# Patient Record
Sex: FEMALE | Race: WHITE | NOT HISPANIC OR LATINO | Employment: FULL TIME | URBAN - METROPOLITAN AREA
[De-identification: names, ages, dates, MRNs, and addresses within clinical notes are randomized per-mention and may not be internally consistent; named-entity substitution may affect disease eponyms.]

---

## 2021-07-01 ENCOUNTER — PATIENT MESSAGE (OUTPATIENT)
Dept: ADMINISTRATIVE | Facility: OTHER | Age: 51
End: 2021-07-01

## 2023-04-21 ENCOUNTER — HOSPITAL ENCOUNTER (OUTPATIENT)
Facility: HOSPITAL | Age: 53
Discharge: HOME | End: 2023-04-22
Attending: STUDENT IN AN ORGANIZED HEALTH CARE EDUCATION/TRAINING PROGRAM | Admitting: FAMILY MEDICINE
Payer: COMMERCIAL

## 2023-04-21 DIAGNOSIS — R55 SYNCOPE: ICD-10-CM

## 2023-04-21 PROBLEM — K52.9 IBD (INFLAMMATORY BOWEL DISEASE): Status: ACTIVE | Noted: 2023-04-21

## 2023-04-21 PROBLEM — M35.01 SJOGREN SYNDROME WITH KERATOCONJUNCTIVITIS: Status: ACTIVE | Noted: 2023-04-21

## 2023-04-21 PROBLEM — I87.1 MAY-THURNER SYNDROME: Status: ACTIVE | Noted: 2023-04-21

## 2023-04-21 LAB
ALBUMIN SERPL BCP-MCNC: 3.4 G/DL (ref 3.5–5.2)
ALP SERPL-CCNC: 125 U/L (ref 55–135)
ALT SERPL W/O P-5'-P-CCNC: 16 U/L (ref 10–44)
ANION GAP SERPL CALC-SCNC: 8 MMOL/L (ref 8–16)
AST SERPL-CCNC: 18 U/L (ref 10–40)
B-HCG UR QL: NEGATIVE
BASOPHILS # BLD AUTO: 0.02 K/UL (ref 0–0.2)
BASOPHILS NFR BLD: 0.3 % (ref 0–1.9)
BILIRUB SERPL-MCNC: 0.5 MG/DL (ref 0.1–1)
BILIRUB UR QL STRIP: NEGATIVE
BUN SERPL-MCNC: 8 MG/DL (ref 6–20)
CALCIUM SERPL-MCNC: 9 MG/DL (ref 8.7–10.5)
CHLORIDE SERPL-SCNC: 109 MMOL/L (ref 95–110)
CLARITY UR: CLEAR
CO2 SERPL-SCNC: 23 MMOL/L (ref 23–29)
COLOR UR: YELLOW
CREAT SERPL-MCNC: 0.8 MG/DL (ref 0.5–1.4)
D DIMER PPP IA.FEU-MCNC: 0.39 MG/L FEU
DIFFERENTIAL METHOD: ABNORMAL
EOSINOPHIL # BLD AUTO: 0 K/UL (ref 0–0.5)
EOSINOPHIL NFR BLD: 0.3 % (ref 0–8)
ERYTHROCYTE [DISTWIDTH] IN BLOOD BY AUTOMATED COUNT: 13.5 % (ref 11.5–14.5)
EST. GFR  (NO RACE VARIABLE): >60 ML/MIN/1.73 M^2
GLUCOSE SERPL-MCNC: 107 MG/DL (ref 70–110)
GLUCOSE UR QL STRIP: NEGATIVE
HCT VFR BLD AUTO: 37.1 % (ref 37–48.5)
HGB BLD-MCNC: 11.5 G/DL (ref 12–16)
HGB UR QL STRIP: ABNORMAL
IMM GRANULOCYTES # BLD AUTO: 0.07 K/UL (ref 0–0.04)
IMM GRANULOCYTES NFR BLD AUTO: 1 % (ref 0–0.5)
KETONES UR QL STRIP: NEGATIVE
LEUKOCYTE ESTERASE UR QL STRIP: NEGATIVE
LYMPHOCYTES # BLD AUTO: 1.5 K/UL (ref 1–4.8)
LYMPHOCYTES NFR BLD: 22.1 % (ref 18–48)
MCH RBC QN AUTO: 28 PG (ref 27–31)
MCHC RBC AUTO-ENTMCNC: 31 G/DL (ref 32–36)
MCV RBC AUTO: 90 FL (ref 82–98)
MONOCYTES # BLD AUTO: 0.4 K/UL (ref 0.3–1)
MONOCYTES NFR BLD: 6 % (ref 4–15)
NEUTROPHILS # BLD AUTO: 4.8 K/UL (ref 1.8–7.7)
NEUTROPHILS NFR BLD: 70.3 % (ref 38–73)
NITRITE UR QL STRIP: NEGATIVE
NRBC BLD-RTO: 0 /100 WBC
PH UR STRIP: 7 [PH] (ref 5–8)
PLATELET # BLD AUTO: 276 K/UL (ref 150–450)
PMV BLD AUTO: 9.9 FL (ref 9.2–12.9)
POCT GLUCOSE: 78 MG/DL (ref 70–110)
POTASSIUM SERPL-SCNC: 3.9 MMOL/L (ref 3.5–5.1)
PROT SERPL-MCNC: 7.1 G/DL (ref 6–8.4)
PROT UR QL STRIP: NEGATIVE
RBC # BLD AUTO: 4.11 M/UL (ref 4–5.4)
SODIUM SERPL-SCNC: 140 MMOL/L (ref 136–145)
SP GR UR STRIP: 1.02 (ref 1–1.03)
TROPONIN I SERPL DL<=0.01 NG/ML-MCNC: <0.006 NG/ML (ref 0–0.03)
TROPONIN I SERPL DL<=0.01 NG/ML-MCNC: <0.006 NG/ML (ref 0–0.03)
TSH SERPL DL<=0.005 MIU/L-ACNC: 3.79 UIU/ML (ref 0.4–4)
URN SPEC COLLECT METH UR: ABNORMAL
UROBILINOGEN UR STRIP-ACNC: NEGATIVE EU/DL
WBC # BLD AUTO: 6.78 K/UL (ref 3.9–12.7)

## 2023-04-21 PROCEDURE — 99222 PR INITIAL HOSPITAL CARE,LEVL II: ICD-10-PCS | Mod: ,,, | Performed by: FAMILY MEDICINE

## 2023-04-21 PROCEDURE — 93010 EKG 12-LEAD: ICD-10-PCS | Mod: 76,,, | Performed by: INTERNAL MEDICINE

## 2023-04-21 PROCEDURE — 94760 N-INVAS EAR/PLS OXIMETRY 1: CPT

## 2023-04-21 PROCEDURE — G0378 HOSPITAL OBSERVATION PER HR: HCPCS

## 2023-04-21 PROCEDURE — 93005 ELECTROCARDIOGRAM TRACING: CPT

## 2023-04-21 PROCEDURE — 99285 EMERGENCY DEPT VISIT HI MDM: CPT | Mod: 25

## 2023-04-21 PROCEDURE — 81025 URINE PREGNANCY TEST: CPT | Performed by: STUDENT IN AN ORGANIZED HEALTH CARE EDUCATION/TRAINING PROGRAM

## 2023-04-21 PROCEDURE — 99222 1ST HOSP IP/OBS MODERATE 55: CPT | Mod: ,,, | Performed by: FAMILY MEDICINE

## 2023-04-21 PROCEDURE — 96360 HYDRATION IV INFUSION INIT: CPT

## 2023-04-21 PROCEDURE — 25000003 PHARM REV CODE 250: Performed by: STUDENT IN AN ORGANIZED HEALTH CARE EDUCATION/TRAINING PROGRAM

## 2023-04-21 PROCEDURE — 81003 URINALYSIS AUTO W/O SCOPE: CPT | Performed by: STUDENT IN AN ORGANIZED HEALTH CARE EDUCATION/TRAINING PROGRAM

## 2023-04-21 PROCEDURE — 36415 COLL VENOUS BLD VENIPUNCTURE: CPT | Performed by: STUDENT IN AN ORGANIZED HEALTH CARE EDUCATION/TRAINING PROGRAM

## 2023-04-21 PROCEDURE — 84443 ASSAY THYROID STIM HORMONE: CPT | Performed by: STUDENT IN AN ORGANIZED HEALTH CARE EDUCATION/TRAINING PROGRAM

## 2023-04-21 PROCEDURE — 85379 FIBRIN DEGRADATION QUANT: CPT | Performed by: STUDENT IN AN ORGANIZED HEALTH CARE EDUCATION/TRAINING PROGRAM

## 2023-04-21 PROCEDURE — 84484 ASSAY OF TROPONIN QUANT: CPT | Performed by: STUDENT IN AN ORGANIZED HEALTH CARE EDUCATION/TRAINING PROGRAM

## 2023-04-21 PROCEDURE — 80053 COMPREHEN METABOLIC PANEL: CPT | Performed by: STUDENT IN AN ORGANIZED HEALTH CARE EDUCATION/TRAINING PROGRAM

## 2023-04-21 PROCEDURE — 96361 HYDRATE IV INFUSION ADD-ON: CPT

## 2023-04-21 PROCEDURE — 85025 COMPLETE CBC W/AUTO DIFF WBC: CPT | Performed by: STUDENT IN AN ORGANIZED HEALTH CARE EDUCATION/TRAINING PROGRAM

## 2023-04-21 PROCEDURE — 25000003 PHARM REV CODE 250: Performed by: FAMILY MEDICINE

## 2023-04-21 PROCEDURE — 93010 ELECTROCARDIOGRAM REPORT: CPT | Mod: 76,,, | Performed by: INTERNAL MEDICINE

## 2023-04-21 RX ORDER — SODIUM CHLORIDE 0.9 % (FLUSH) 0.9 %
10 SYRINGE (ML) INJECTION
Status: DISCONTINUED | OUTPATIENT
Start: 2023-04-21 | End: 2023-04-22 | Stop reason: HOSPADM

## 2023-04-21 RX ORDER — TALC
6 POWDER (GRAM) TOPICAL NIGHTLY PRN
Status: DISCONTINUED | OUTPATIENT
Start: 2023-04-21 | End: 2023-04-22 | Stop reason: HOSPADM

## 2023-04-21 RX ORDER — PROMETHAZINE HYDROCHLORIDE 12.5 MG/1
12.5 TABLET ORAL EVERY 6 HOURS PRN
Status: DISCONTINUED | OUTPATIENT
Start: 2023-04-21 | End: 2023-04-22 | Stop reason: HOSPADM

## 2023-04-21 RX ORDER — ONDANSETRON 4 MG/1
4 TABLET, ORALLY DISINTEGRATING ORAL EVERY 6 HOURS PRN
Status: DISCONTINUED | OUTPATIENT
Start: 2023-04-21 | End: 2023-04-21 | Stop reason: SDUPTHER

## 2023-04-21 RX ORDER — MECLIZINE HYDROCHLORIDE 25 MG/1
25 TABLET ORAL 3 TIMES DAILY PRN
Status: DISCONTINUED | OUTPATIENT
Start: 2023-04-21 | End: 2023-04-22 | Stop reason: HOSPADM

## 2023-04-21 RX ORDER — MECLIZINE HYDROCHLORIDE 25 MG/1
50 TABLET ORAL
Status: COMPLETED | OUTPATIENT
Start: 2023-04-21 | End: 2023-04-21

## 2023-04-21 RX ORDER — ONDANSETRON 4 MG/1
4 TABLET, ORALLY DISINTEGRATING ORAL EVERY 6 HOURS PRN
Status: DISCONTINUED | OUTPATIENT
Start: 2023-04-21 | End: 2023-04-22 | Stop reason: HOSPADM

## 2023-04-21 RX ORDER — ONDANSETRON 4 MG/1
4 TABLET, ORALLY DISINTEGRATING ORAL EVERY 6 HOURS PRN
Status: DISCONTINUED | OUTPATIENT
Start: 2023-04-21 | End: 2023-04-21

## 2023-04-21 RX ADMIN — ONDANSETRON 4 MG: 4 TABLET, ORALLY DISINTEGRATING ORAL at 11:04

## 2023-04-21 RX ADMIN — MECLIZINE HYDROCHLORIDE 50 MG: 25 TABLET ORAL at 09:04

## 2023-04-21 RX ADMIN — SODIUM CHLORIDE 1000 ML: 0.9 INJECTION, SOLUTION INTRAVENOUS at 08:04

## 2023-04-21 RX ADMIN — MECLIZINE HYDROCHLORIDE 25 MG: 25 TABLET ORAL at 11:04

## 2023-04-21 RX ADMIN — SODIUM CHLORIDE 1000 ML: 0.9 INJECTION, SOLUTION INTRAVENOUS at 09:04

## 2023-04-21 NOTE — CONSULTS
[facility] - Emergency Dept  Cardiology  Consult Note    Patient Name: Dequan Cardenas  MRN: [id number]  Admission Date: 2023  Hospital Length of Stay: 0 days  Code Status: No Order   Attending Provider: Alex Turner MD   Consulting Provider: Ya Amezcua NP  Primary Care Physician: Akhil Dill MD  Principal Problem:<principal problem not specified>        Inpatient consult to [facility]  Consult performed by: Ya Amezcua NP  Consult ordered by: Alex Turner MD      Subjective:     Chief Complaint:  dizziness and syncope     HPI: 52-year-old female with history of IBS, Sjogren's, thyroid disorder, previous anemia, presenting with lightheadedness and one episode of syncope.  Patient reports that this morning at around 3:00 a.m., she went to urinate, and felt slightly lightheaded.  Vomited once this morning, and on her way to work became very dizzy.  She pulled over, called for help.  When EMS arrived they noted her to have a syncopal episode.  Patient came around immediately.  Denies any chest pain or shortness of breath.  Denies any large volume losses with diarrhea.  Denies any bleeding.  No cough, congestion    Only seen sporadically  by [facility] most recently in     Past Medical History:   Diagnosis Date    IBS (irritable bowel syndrome)     Sjogren's syndrome        Past Surgical History:   Procedure Laterality Date     SECTION      x2    CHOLECYSTECTOMY      HYSTERECTOMY         Review of patient's allergies indicates:   Allergen Reactions    Iodinated contrast media        No current facility-administered medications on file prior to encounter.     Current Outpatient Medications on File Prior to Encounter   Medication Sig    colestipoL (COLESTID) 1 gram Tab colestipol 1 gram tablet   TAKE ONE TABLET BY MOUTH PRIOR TO BREAKFAST AND SUPPER    diphenoxylate-atropine 2.5-0.025 mg (LOMOTIL) 2.5-0.025 mg per tablet Take 1 tablet by mouth 2 (two) times daily as needed.     ergocalciferol, vitamin D2, (VITAMIN D ORAL) Take by mouth.     Family History       Problem Relation (Age of Onset)    COPD Mother    Cancer Mother (70)    Heart attack Father    Heart disease Mother, Father    Mitral valve prolapse Mother    Stroke Mother          Tobacco Use    Smoking status: Never    Smokeless tobacco: Never   Substance and Sexual Activity    Alcohol use: Not Currently    Drug use: Never    Sexual activity: Not on file     ROS  Constitutional:  Negative for fever.   HENT:  Negative for congestion and sore throat.    Respiratory:  Negative for shortness of breath.    Cardiovascular:  Negative for chest pain.   Gastrointestinal:  Positive for nausea and vomiting. Negative for abdominal pain and diarrhea.   Genitourinary:  Negative for dysuria.   Musculoskeletal:  Negative for back pain.   Skin:  Negative for rash.   Neurological:  Positive for syncope and light-headedness. Negative for weakness.   Hematological:  Does not bruise/bleed easily.   Objective:     Vital Signs (Most Recent):  Temp: 97.8 °F (36.6 °C) (04/21/23 0758)  Pulse: 60 (04/21/23 1002)  Resp: 15 (04/21/23 1002)  BP: 123/65 (04/21/23 1002)  SpO2: 100 % (04/21/23 1002) Vital Signs (24h Range):  Temp:  [97.8 °F (36.6 °C)] 97.8 °F (36.6 °C)  Pulse:  [58-80] 60  Resp:  [10-20] 15  SpO2:  [98 %-100 %] 100 %  BP: (112-141)/(52-79) 123/65        There is no height or weight on file to calculate BMI.    SpO2: 100 %         Intake/Output Summary (Last 24 hours) at 4/21/2023 1017  Last data filed at 4/21/2023 0853  Gross per 24 hour   Intake 1000 ml   Output --   Net 1000 ml       Lines/Drains/Airways       Peripheral Intravenous Line  Duration                  Peripheral IV - Single Lumen 04/21/23 0756 20 G Left Hand <1 day                    Physical Exam  Nursing note and vitals reviewed.  Constitutional: She appears well-developed.   HENT:   Head: Normocephalic.   Eyes: Pupils are equal, round, and reactive to light.   Neck:   Normal  range of motion.  Cardiovascular:            No murmur heard.  Pulmonary/Chest: No respiratory distress.   Clear lungs bilaterally.  No respiratory distress.  No wheezing or rales.  Good air movement.     Abdominal: Abdomen is soft. She exhibits no distension. There is no abdominal tenderness. There is no rebound.   Musculoskeletal:         General: No edema.      Cervical back: Normal range of motion.      Neurological: She is alert and oriented to person, place, and time.   Alert and oriented to person place and time. No facial droop. CN 2-12 intact. Fluent speech with expression and comprehension. Strength 5/5 and sensation intact in upper and lower extremities.   Skin: Skin is warm.   Psychiatric: She has a normal mood and affect.   Significant Labs:   Recent Lab Results         04/21/23  0804        Albumin 3.4       Alkaline Phosphatase 125       ALT 16       Anion Gap 8       AST 18       Baso # 0.02       Basophil % 0.3       BILIRUBIN TOTAL 0.5  Comment: For infants and newborns, interpretation of results should be based  on gestational age, weight and in agreement with clinical  observations.    Premature Infant recommended reference ranges:  Up to 24 hours.............<8.0 mg/dL  Up to 48 hours............<12.0 mg/dL  3-5 days..................<15.0 mg/dL  6-29 days.................<15.0 mg/dL         BUN 8       Calcium 9.0       Chloride 109       CO2 23       Creatinine 0.8       Differential Method Automated       eGFR >60       Eos # 0.0       Eosinophil % 0.3       Glucose 107       Gran # (ANC) 4.8       Gran % 70.3       Hematocrit 37.1       Hemoglobin 11.5       Immature Grans (Abs) 0.07  Comment: Mild elevation in immature granulocytes is non specific and   can be seen in a variety of conditions including stress response,   acute inflammation, trauma and pregnancy. Correlation with other   laboratory and clinical findings is essential.         Immature Granulocytes 1.0       Lymph # 1.5        Lymph % 22.1       MCH 28.0       MCHC 31.0       MCV 90       Mono # 0.4       Mono % 6.0       MPV 9.9       nRBC 0       Platelets 276       Potassium 3.9       PROTEIN TOTAL 7.1       RBC 4.11       RDW 13.5       Sodium 140       Troponin I <0.006  Comment: The reference interval for Troponin I represents the 99th percentile   cutoff   for our facility and is consistent with 3rd generation assay   performance.         TSH 3.786       WBC 6.78               Significant Imaging:   CT head pending  Echo 2013:     The study quality is average.   The left ventricle is normal in size. Global left ventricular systolic function is normal. The left ventricular ejection fraction is 60%. Left ventricular diastolic function is normal.   Diffuse thickening of the aortic valve cusps.  The pulmonary artery systolic pressure is 29 mmHg.                 Assessment and Plan:     There are no hospital problems to display for this patient.      VTE Risk Mitigation (From admission, onward)      None          Dx:  Syncope/dizziness despite normal VS, mild transient nystagmus on exam suggestive of neuro etiology. Head CT ok  Bradycardia, LVEF 60% 2013.  Dizziness  May Thurner     Plan  May need neuro input and obs  Daphne, maria del carmen Amezcua NP  Cardiology   [facility] - Emergency Dept  I attest that I have personally seen and examined this patient. I have reviewed and discussed the management in detail as outlined above.

## 2023-04-21 NOTE — H&P
[facility] Surg ([address] [place] [address] Medicine  History & Physical    Patient Name: Dequan Cardenas  MRN: [id number]  Patient Class: OP- Observation  Admission Date: 2023  Attending Physician: Gale Blum MD   Primary Care Provider: Akhil Dill MD         Patient information was obtained from patient, spouse/SO and ER records.     Subjective:     Principal Problem:<principal problem not specified>    Chief Complaint:   Chief Complaint   Patient presents with    Dizziness     Brought via [facility] with complaints of dizziness and syncope episode this morning  EMS reports pt became very dizzy while driving to work. States when the patient stood up she passed out.         HPI: 52 year old female with sjogrens and may thurner syndrome as well as ibd was getting to school this morning and stopped at [employer] for a coke. However, on her way to school and this morning she felt dizzy. Once she got to school she says she felt really bad and told her friend that she might not be able to make it and to come check on her in case she passed out. She got very dizzy when getting out of her car so she was sat back down and EMS was called. When she tried to stand again she had a complete syncopal spell. She says she was told that she looked pale and bad. The first time she felt like she was going to pass out and 'felt it coming on.' The second time it was witness per EMS. Work up at that point was normal. In the ER she was seen by cardiology who felt like the etiology was non cardiac. She was also evaluated and neurology has recommended that she be observed for resolution of dizziness and no further spells. After eating, her symtpoms have pretty much resolved.      Past Medical History:   Diagnosis Date    IBS (irritable bowel syndrome)     Sjogren's syndrome        Past Surgical History:   Procedure Laterality Date     SECTION      x2    CHOLECYSTECTOMY      HYSTERECTOMY         Review of patient's  allergies indicates:   Allergen Reactions    Iodinated contrast media        No current facility-administered medications on file prior to encounter.     Current Outpatient Medications on File Prior to Encounter   Medication Sig    colestipoL (COLESTID) 1 gram Tab 1 g once daily.    diphenoxylate-atropine 2.5-0.025 mg (LOMOTIL) 2.5-0.025 mg per tablet Take 1 tablet by mouth 2 (two) times daily as needed.    ergocalciferol, vitamin D2, (VITAMIN D ORAL) Take by mouth.     Family History       Problem Relation (Age of Onset)    COPD Mother    Cancer Mother (70)    Heart attack Father    Heart disease Mother, Father    Mitral valve prolapse Mother    Stroke Mother          Tobacco Use    Smoking status: Never    Smokeless tobacco: Never   Substance and Sexual Activity    Alcohol use: Not Currently    Drug use: Never    Sexual activity: Not on file     Review of Systems   Constitutional:  Negative for chills and fever.   HENT:  Negative for congestion, ear pain, postnasal drip, rhinorrhea, sore throat and trouble swallowing.    Eyes:  Negative for redness and itching.   Respiratory:  Negative for cough, shortness of breath and wheezing.    Cardiovascular:  Negative for chest pain and palpitations.   Gastrointestinal:  Negative for abdominal pain, diarrhea, nausea and vomiting.   Genitourinary:  Negative for dysuria and frequency.   Skin:  Negative for rash.   Neurological:  Positive for dizziness, syncope and light-headedness. Negative for weakness and headaches.   Objective:     Vital Signs (Most Recent):  Temp: 97.8 °F (36.6 °C) (04/21/23 1629)  Pulse: (!) 57 (04/21/23 1629)  Resp: 14 (04/21/23 1629)  BP: 139/62 (04/21/23 1629)  SpO2: 100 % (04/21/23 1629)   Vital Signs (24h Range):  Temp:  [97.7 °F (36.5 °C)-98.2 °F (36.8 °C)] 97.8 °F (36.6 °C)  Pulse:  [57-80] 57  Resp:  [10-20] 14  SpO2:  [98 %-100 %] 100 %  BP: (112-146)/(52-79) 139/62     Weight: 104.8 kg (231 lb)  Body mass index is 42.25  kg/m².    Physical Exam  Vitals and nursing note reviewed.   Constitutional:       General: She is not in acute distress.     Appearance: She is well-developed.   HENT:      Head: Normocephalic and atraumatic.   Eyes:      Conjunctiva/sclera: Conjunctivae normal.      Pupils: Pupils are equal, round, and reactive to light.   Neck:      Thyroid: No thyromegaly.   Cardiovascular:      Rate and Rhythm: Normal rate and regular rhythm.      Heart sounds: Normal heart sounds.   Pulmonary:      Effort: Pulmonary effort is normal. No respiratory distress.      Breath sounds: Normal breath sounds. No wheezing.   Abdominal:      General: Bowel sounds are normal.      Palpations: Abdomen is soft.      Tenderness: There is no abdominal tenderness.   Musculoskeletal:         General: Normal range of motion.      Cervical back: Normal range of motion and neck supple.   Lymphadenopathy:      Cervical: No cervical adenopathy.   Skin:     General: Skin is warm and dry.      Findings: No rash.   Neurological:      Mental Status: She is alert and oriented to person, place, and time.   Psychiatric:         Behavior: Behavior normal.         CRANIAL NERVES     CN III, IV, VI   Pupils are equal, round, and reactive to light.     Significant Labs: All pertinent labs within the past 24 hours have been reviewed.  CBC:   Recent Labs   Lab 04/21/23  0804   WBC 6.78   HGB 11.5*   HCT 37.1        CMP:   Recent Labs   Lab 04/21/23  0804      K 3.9      CO2 23      BUN 8   CREATININE 0.8   CALCIUM 9.0   PROT 7.1   ALBUMIN 3.4*   BILITOT 0.5   ALKPHOS 125   AST 18   ALT 16   ANIONGAP 8     Cardiac Markers: No results for input(s): CKMB, MYOGLOBIN, BNP, TROPISTAT in the last 48 hours.  Lactic Acid: No results for input(s): LACTATE in the last 48 hours.  Magnesium: No results for input(s): MG in the last 48 hours.  Troponin:   Recent Labs   Lab 04/21/23  0804 04/21/23  1032   TROPONINI <0.006 <0.006     TSH:   Recent Labs  "  Lab 04/21/23  0804   TSH 3.786     Urine Culture: No results for input(s): LABURIN in the last 48 hours.    Significant Imaging: I have reviewed all pertinent imaging results/findings within the past 24 hours.        Assessment/Plan:     Sjogren syndrome with keratoconjunctivitis  Not taking meds - "only affects her eyes."      IBD (inflammatory bowel disease)  At baseline. Has had no recent diarrhea      May-Thurner syndrome  Not on anticoagulation.  "found by accident by [facility]"      Syncope  ER work up reassuring.  Keep on tele overnight.  MRI reassuring.  May d/c in am for further outpatinet workup if dizziness has resolved and patient without further symptoms.        VTE Risk Mitigation (From admission, onward)         Ordered     IP VTE HIGH RISK PATIENT  Once         04/21/23 1627     Place sequential compression device  Until discontinued         04/21/23 1627                   On 04/21/2023, patient should be placed in hospital observation services under my care.        Gale Blum MD  Department of Hospital Medicine  [place] - Med Surg (3rd Fl)  "

## 2023-04-21 NOTE — ED TRIAGE NOTES
52 y.o. female presents to ER ED 02/ED 02A   Chief Complaint   Patient presents with    Dizziness     Brought via [facility] with complaints of dizziness and syncope episode this morning  EMS reports pt became very dizzy while driving to work. States when the patient stood up she passed out.    . No acute distress noted.

## 2023-04-21 NOTE — ASSESSMENT & PLAN NOTE
ER work up reassuring.  Keep on tele overnight.  MRI reassuring.  May d/c in am for further outpatinet workup if dizziness has resolved and patient without further symptoms.

## 2023-04-21 NOTE — SUBJECTIVE & OBJECTIVE
Past Medical History:   Diagnosis Date    IBS (irritable bowel syndrome)     Sjogren's syndrome        Past Surgical History:   Procedure Laterality Date     SECTION      x2    CHOLECYSTECTOMY      HYSTERECTOMY         Review of patient's allergies indicates:   Allergen Reactions    Iodinated contrast media        No current facility-administered medications on file prior to encounter.     Current Outpatient Medications on File Prior to Encounter   Medication Sig    colestipoL (COLESTID) 1 gram Tab 1 g once daily.    diphenoxylate-atropine 2.5-0.025 mg (LOMOTIL) 2.5-0.025 mg per tablet Take 1 tablet by mouth 2 (two) times daily as needed.    ergocalciferol, vitamin D2, (VITAMIN D ORAL) Take by mouth.     Family History       Problem Relation (Age of Onset)    COPD Mother    Cancer Mother (70)    Heart attack Father    Heart disease Mother, Father    Mitral valve prolapse Mother    Stroke Mother          Tobacco Use    Smoking status: Never    Smokeless tobacco: Never   Substance and Sexual Activity    Alcohol use: Not Currently    Drug use: Never    Sexual activity: Not on file     Review of Systems   Constitutional:  Negative for chills and fever.   HENT:  Negative for congestion, ear pain, postnasal drip, rhinorrhea, sore throat and trouble swallowing.    Eyes:  Negative for redness and itching.   Respiratory:  Negative for cough, shortness of breath and wheezing.    Cardiovascular:  Negative for chest pain and palpitations.   Gastrointestinal:  Negative for abdominal pain, diarrhea, nausea and vomiting.   Genitourinary:  Negative for dysuria and frequency.   Skin:  Negative for rash.   Neurological:  Positive for dizziness, syncope and light-headedness. Negative for weakness and headaches.   Objective:     Vital Signs (Most Recent):  Temp: 97.8 °F (36.6 °C) (23)  Pulse: (!) 57 (23)  Resp: 14 (23)  BP: 139/62 (23)  SpO2: 100 % (23)   Vital Signs (24h  Range):  Temp:  [97.7 °F (36.5 °C)-98.2 °F (36.8 °C)] 97.8 °F (36.6 °C)  Pulse:  [57-80] 57  Resp:  [10-20] 14  SpO2:  [98 %-100 %] 100 %  BP: (112-146)/(52-79) 139/62     Weight: 104.8 kg (231 lb)  Body mass index is 42.25 kg/m².    Physical Exam  Vitals and nursing note reviewed.   Constitutional:       General: She is not in acute distress.     Appearance: She is well-developed.   HENT:      Head: Normocephalic and atraumatic.   Eyes:      Conjunctiva/sclera: Conjunctivae normal.      Pupils: Pupils are equal, round, and reactive to light.   Neck:      Thyroid: No thyromegaly.   Cardiovascular:      Rate and Rhythm: Normal rate and regular rhythm.      Heart sounds: Normal heart sounds.   Pulmonary:      Effort: Pulmonary effort is normal. No respiratory distress.      Breath sounds: Normal breath sounds. No wheezing.   Abdominal:      General: Bowel sounds are normal.      Palpations: Abdomen is soft.      Tenderness: There is no abdominal tenderness.   Musculoskeletal:         General: Normal range of motion.      Cervical back: Normal range of motion and neck supple.   Lymphadenopathy:      Cervical: No cervical adenopathy.   Skin:     General: Skin is warm and dry.      Findings: No rash.   Neurological:      Mental Status: She is alert and oriented to person, place, and time.   Psychiatric:         Behavior: Behavior normal.         CRANIAL NERVES     CN III, IV, VI   Pupils are equal, round, and reactive to light.     Significant Labs: All pertinent labs within the past 24 hours have been reviewed.  CBC:   Recent Labs   Lab 04/21/23  0804   WBC 6.78   HGB 11.5*   HCT 37.1        CMP:   Recent Labs   Lab 04/21/23  0804      K 3.9      CO2 23      BUN 8   CREATININE 0.8   CALCIUM 9.0   PROT 7.1   ALBUMIN 3.4*   BILITOT 0.5   ALKPHOS 125   AST 18   ALT 16   ANIONGAP 8     Cardiac Markers: No results for input(s): CKMB, MYOGLOBIN, BNP, TROPISTAT in the last 48 hours.  Lactic Acid: No  results for input(s): LACTATE in the last 48 hours.  Magnesium: No results for input(s): MG in the last 48 hours.  Troponin:   Recent Labs   Lab 04/21/23  0804 04/21/23  1032   TROPONINI <0.006 <0.006     TSH:   Recent Labs   Lab 04/21/23  0804   TSH 3.786     Urine Culture: No results for input(s): LABURIN in the last 48 hours.    Significant Imaging: I have reviewed all pertinent imaging results/findings within the past 24 hours.

## 2023-04-21 NOTE — ED PROVIDER NOTES
Encounter Date: 2023       History     Chief Complaint   Patient presents with    Dizziness     Brought via [facility] with complaints of dizziness and syncope episode this morning  EMS reports pt became very dizzy while driving to work. States when the patient stood up she passed out.      52-year-old female with history of IBS, Sjogren's, thyroid disorder, previous anemia, presenting with lightheadedness and one episode of syncope.  Patient reports that this morning at around 3:00 a.m., she went to urinate, and felt slightly lightheaded.  Vomited once this morning, and on her way to work became very dizzy.  She pulled over, called for help.  When EMS arrived they noted her to have a syncopal episode.  Patient came around immediately.  Denies any chest pain or shortness of breath.  Denies any large volume losses with diarrhea.  Denies any bleeding.  No cough, congestion.    Review of patient's allergies indicates:   Allergen Reactions    Iodinated contrast media      Past Medical History:   Diagnosis Date    IBS (irritable bowel syndrome)     Sjogren's syndrome      Past Surgical History:   Procedure Laterality Date     SECTION      x2    CHOLECYSTECTOMY      HYSTERECTOMY       Family History   Problem Relation Age of Onset    Heart disease Mother     Stroke Mother     COPD Mother     Cancer Mother 70        breast    Mitral valve prolapse Mother     Heart attack Father     Heart disease Father      Social History     Tobacco Use    Smoking status: Never    Smokeless tobacco: Never   Substance Use Topics    Alcohol use: Not Currently    Drug use: Never     Review of Systems   Constitutional:  Negative for fever.   HENT:  Negative for congestion and sore throat.    Respiratory:  Negative for shortness of breath.    Cardiovascular:  Negative for chest pain.   Gastrointestinal:  Positive for nausea and vomiting. Negative for abdominal pain and diarrhea.   Genitourinary:  Negative for dysuria.    Musculoskeletal:  Negative for back pain.   Skin:  Negative for rash.   Neurological:  Positive for syncope and light-headedness. Negative for weakness.   Hematological:  Does not bruise/bleed easily.     Physical Exam     Initial Vitals   BP Pulse Resp Temp SpO2   04/21/23 0754 04/21/23 0754 04/21/23 0754 04/21/23 0758 04/21/23 0754   129/61 69 18 97.8 °F (36.6 °C) 99 %      MAP       --                Physical Exam    Nursing note and vitals reviewed.  Constitutional: She appears well-developed.   HENT:   Head: Normocephalic.   Eyes: Pupils are equal, round, and reactive to light.   Neck:   Normal range of motion.  Cardiovascular:            No murmur heard.  Pulmonary/Chest: No respiratory distress.   Clear lungs bilaterally.  No respiratory distress.  No wheezing or rales.  Good air movement.     Abdominal: Abdomen is soft. She exhibits no distension. There is no abdominal tenderness. There is no rebound.   Musculoskeletal:         General: No edema.      Cervical back: Normal range of motion.     Neurological: She is alert and oriented to person, place, and time.   Alert and oriented to person place and time. No facial droop. CN 2-12 intact. Fluent speech with expression and comprehension. Strength 5/5 and sensation intact in upper and lower extremities.   Skin: Skin is warm.   Psychiatric: She has a normal mood and affect.       ED Course   Procedures  Labs Reviewed   CBC W/ AUTO DIFFERENTIAL - Abnormal; Notable for the following components:       Result Value    Hemoglobin 11.5 (*)     MCHC 31.0 (*)     Immature Granulocytes 1.0 (*)     Immature Grans (Abs) 0.07 (*)     All other components within normal limits   COMPREHENSIVE METABOLIC PANEL - Abnormal; Notable for the following components:    Albumin 3.4 (*)     All other components within normal limits   URINALYSIS, REFLEX TO URINE CULTURE - Abnormal; Notable for the following components:    Occult Blood UA Trace (*)     All other components within  normal limits    Narrative:     Specimen Source->Urine   TROPONIN I   PREGNANCY TEST, URINE RAPID    Narrative:     Specimen Source->Urine   TSH   TSH   TROPONIN I   D DIMER, QUANTITATIVE     EKG Readings: (Independently Interpreted)   Initial Reading: No STEMI. Rhythm: Sinus Bradycardia. Heart Rate: 56. Ectopy: No Ectopy. Conduction: Normal.     Imaging Results              MRA Brain without contrast (Final result)  Result time 04/21/23 14:04:44      Final result by Michael Cruz MD (04/21/23 14:04:44)                   Impression:      No evidence for intracranial aneurysm, stenosis, or slow flow within the anterior or posterior circulation.      Electronically signed by: Michael Cruz MD  Date:    04/21/2023  Time:    14:04               Narrative:    EXAMINATION:  MRA BRAIN WITHOUT CONTRAST    CLINICAL HISTORY:  Dizziness, persistent/recurrent, cardiac or vascular cause suspected;    COMPARISON:  No priors available    TECHNIQUE:  MRA of the [id number]-[id number] was performed utilizing time-of-flight imaging.  Three-dimensional rotational maximum intensity projection images evaluated along with the source images.    FINDINGS:  Imaged distal aspect of vertebral arteries demonstrate normal flow related signal.  The basilar artery appears widely patent.  Intact bilateral anterior inferior cerebellar arteries and superior cerebellar arteries are seen arising from the basilar artery.  Intact bilateral posterior cerebral arteries are seen arising from the terminal basilar and appear widely patent.  Petrous and cavernous portions of bilateral internal carotid arteries appear widely patent.  Carotid siphons are widely patent.  Symmetric maintained flow related signal throughout the course of bilateral middle cerebral arteries.  Bilateral anterior cerebral arteries appear intact.  Intact anterior communicating artery.  The posterior communicating arteries are not seen which may be related to congenital absence or hypoplasia.   No detected evidence for aneurysm within the anterior or posterior circulation.                                       MRI Brain Without Contrast (Final result)  Result time 04/21/23 13:59:28   Procedure changed from MRI Brain W WO Contrast     Final result by Michael Cruz MD (04/21/23 13:59:28)                   Impression:      1. No evidence for recent ischemia.  2. Rounded cystic signal abnormality within the left lateral ventricle abutting the septum pellucidum is nonspecific.  Recommend follow-up contrast enhanced MRI of the brain to further assess.      Electronically signed by: Michael Cruz MD  Date:    04/21/2023  Time:    13:59               Narrative:    EXAMINATION:  MRI BRAIN WITHOUT CONTRAST    CLINICAL HISTORY:  Dizziness, non-specific;    COMPARISON:  Correlation with CT head on today's date    TECHNIQUE:  Multiplanar multisequence MR imaging performed of the brain without contrast    FINDINGS:  No intraparenchymal diffusion signal abnormalities are detected.  There is a rounded focus of intense increased diffusion signal within the posterior body of the left lateral ventricle measuring approximately 6 mm on the diffusion sequence.  The ADC map demonstrates decreased signal within this lesion.  Lesion abuts the septum pellucidum at its posterior aspect.  On T2 sequences, this lesion is internally T2 hyperintense with a very thin peripheral hypointense rim spanning 1 cm in diameter.  This rim is relatively isointense to brain parenchyma on the T1 sequence with internal signal on T1 similar to that of CSF.  No signal abnormality within this lesion on the T2 star sequence.    No ventricular or basal cistern effacement.  No midline shift or mass effect.  Major intracranial flow voids are intact.  Oval-shaped 1.7 cm mucous retention cyst inferior aspect right maxillary sinus.  Mastoid air cells are clear.  Calvarial signal is intact.  Maintained signal throughout the cerebral and cerebellar parenchyma.   Corpus callosum, pituitary gland, and remaining midline structures are unremarkable.                                       CT Head Without Contrast (Final result)  Result time 04/21/23 10:07:32      Final result by Michael Cruz MD (04/21/23 10:07:32)                   Impression:      No acute intracranial process detected.      Electronically signed by: Michael Cruz MD  Date:    04/21/2023  Time:    10:07               Narrative:    EXAMINATION:  CT HEAD WITHOUT CONTRAST    CLINICAL HISTORY:  Syncope dizziness, persistent/recurrent, cardiac or vascular cause suspected;    TECHNIQUE:  Axial CT images were obtained from the skull base to the vertex without contrast. Iterative reconstruction technique was used.  CT/Cardiac nuclear examinations in the past 12 months: 0    COMPARISON:  No priors available    FINDINGS:  Ventricles and basal cisterns are midline and without effacement. No evidence of acute hemorrhage, midline shift, mass, or mass effect. No evidence of acute regional infarct. No detected extra-axial fluid collections. Imaged paranasal sinuses and mastoid air cells are clear.  Calvarium is intact.                                       Medications   sodium chloride 0.9% bolus 1,000 mL 1,000 mL (0 mLs Intravenous Stopped 4/21/23 0853)   sodium chloride 0.9% bolus 1,000 mL 1,000 mL (0 mLs Intravenous Stopped 4/21/23 1116)   meclizine tablet 50 mg (50 mg Oral Given 4/21/23 0944)     Medical Decision Making:   Differential Diagnosis:   DDX: Syncope - given age concern for cardiogenic syncope given history, risk factors. Less likely neurological given nonfocal neuro exam, history, no urinary incontinence, no tongue biting, no seizure like activity. R/o hyper/hypoglycemia, occult infection.  Otherwise likely vasovagal.  Patient has no concerning morphologies on their EKG for any concerning underlying cardiac pathology (including ACS, Brugada, Wellens, Prolonged QT, HOCM, WPW, ARVD)     DX: BMP, CBC, TSH, trop,  EKG. UA. CXR.   TX: Analgesia PRN. IVF if poor PO intake.   DISPO: RETU vs. admit based on initial testing.                ED Course as of 04/22/23 1725   Fri Apr 21, 2023   0813 Hemoglobin(!): 11.5 [NB]   0813 WBC: 6.78 [NB]   0951 Negative Courtland-Hallpike bilaterally [NB]   1021 Worsens when patient attempts to stand up. [NB]   1051 I have evaluated this EKG (Date: 4/21/23 Time: 10:50 ) and found the following:    Rate: 56  Rhythm: Sinus juarez  Axis: Normal  Signs of ischemia: None    Conclusion: NSR    Patient has no concerning morphologies on their EKG for any concerning underlying cardiac pathology (including ACS, Brugada, Wellens, Prolonged QT, HOCM, WPW, ARVD)   [NB]   1132 W/u neg. Pending [facility] consult [NB]      ED Course User Index  [NB] Alex Turner MD                   Clinical Impression:   Final diagnoses:  [R55] Syncope        ED Disposition Condition    Observation Stable                Alex Turner MD  04/21/23 0758       Alex Turner MD  04/21/23 0813       Alex Turner MD  04/21/23 0953       Alex Turner MD  04/22/23 1725

## 2023-04-21 NOTE — HPI
52 year old female with sjogrens and may thurner syndrome as well as ibd was getting to school this morning and stopped at [employer] for a coke. However, on her way to school and this morning she felt dizzy. Once she got to school she says she felt really bad and told her friend that she might not be able to make it and to come check on her in case she passed out. She got very dizzy when getting out of her car so she was sat back down and EMS was called. When she tried to stand again she had a complete syncopal spell. She says she was told that she looked pale and bad. The first time she felt like she was going to pass out and 'felt it coming on.' The second time it was witness per EMS. Work up at that point was normal. In the ER she was seen by cardiology who felt like the etiology was non cardiac. She was also evaluated and neurology has recommended that she be observed for resolution of dizziness and no further spells. After eating, her symtpoms have pretty much resolved.

## 2023-04-22 VITALS
OXYGEN SATURATION: 99 % | RESPIRATION RATE: 12 BRPM | SYSTOLIC BLOOD PRESSURE: 133 MMHG | HEIGHT: 62 IN | HEART RATE: 60 BPM | BODY MASS INDEX: 42.51 KG/M2 | DIASTOLIC BLOOD PRESSURE: 66 MMHG | WEIGHT: 231 LBS | TEMPERATURE: 98 F

## 2023-04-22 PROCEDURE — 99238 HOSP IP/OBS DSCHRG MGMT 30/<: CPT | Mod: ,,, | Performed by: FAMILY MEDICINE

## 2023-04-22 PROCEDURE — G0378 HOSPITAL OBSERVATION PER HR: HCPCS

## 2023-04-22 PROCEDURE — 99238 PR HOSPITAL DISCHARGE DAY,<30 MIN: ICD-10-PCS | Mod: ,,, | Performed by: FAMILY MEDICINE

## 2023-04-22 PROCEDURE — 94760 N-INVAS EAR/PLS OXIMETRY 1: CPT

## 2023-04-22 RX ORDER — ONDANSETRON 4 MG/1
4 TABLET, ORALLY DISINTEGRATING ORAL EVERY 6 HOURS PRN
Qty: 12 TABLET | Refills: 0 | Status: SHIPPED | OUTPATIENT
Start: 2023-04-22

## 2023-04-22 RX ORDER — MECLIZINE HYDROCHLORIDE 25 MG/1
25 TABLET ORAL 3 TIMES DAILY
Qty: 9 TABLET | Refills: 0 | Status: SHIPPED | OUTPATIENT
Start: 2023-04-22 | End: 2023-04-25 | Stop reason: ALTCHOICE

## 2023-04-22 NOTE — PLAN OF CARE
[facility] - Med Surg (3rd Fl)  Discharge Assessment    Primary Care Provider: Akhil Dill MD     Discharge Assessment (most recent)       BRIEF DISCHARGE ASSESSMENT - 04/22/23 1014          Discharge Planning    Assessment Type Discharge Planning Brief Assessment     Resource/Environmental Concerns none     Support Systems Spouse/significant other     Assistance Needed None     Current Living Arrangements home     Patient/Family Anticipates Transition to home     Patient/Family Anticipated Services at Transition none     DME Needed Upon Discharge  none     Discharge Plan A Home     Discharge Plan B Home                         Discharge assessment completed. There are no post-acute care needs identified at this time.   
[facility] - Med Surg (3rd Fl)  Discharge Final Note    Primary Care Provider: Akhil Dill MD    Expected Discharge Date: 4/22/2023    Final Discharge Note (most recent)       Final Note - 04/22/23 1015          Final Note    Assessment Type Final Discharge Note     Anticipated Discharge Disposition Home or Self Care        Post-Acute Status    Post-Acute Authorization Other     Other Status No Post-Acute Service Needs     Discharge Delays None known at this time                     Important Message from Medicare             Contact Info       Victor Manuel Muñoz MD   Specialty: Neurology    [address] [address]   Phone: [phone]       Next Steps: Follow up    Instructions: Needs appt early this week with one of the providers in the neurology clinic please.              Unable to schedule appointments due to clinic closure on the weekend.   
V/S stable. Patient had a dizzy/motion sickness and nauseous episode. Patient tried going to bathroom and was unable to tolerate standing. Bedpan had to be used. Dr. Blum was notified @4107. Zofran was ordered. V/S were stable and blood sugar was normal. Patient was AOX4. No drift or facial asymmetry were noted at the time. Meclizine was ordered for dizziness as well. After administration of both zofran and meclizine patient had full relief. Patient is up to bathroom WITH assistance.   Problem: Adult Inpatient Plan of Care  Goal: Optimal Comfort and Wellbeing  Outcome: Ongoing, Progressing     Problem: Bariatric Environmental Safety  Goal: Safety Maintained with Care  Outcome: Ongoing, Progressing     
VS stable. Discharge education provided to pt.     Problem: Syncope  Goal: Absence of Syncopal Symptoms  Outcome: Met     Problem: Bariatric Environmental Safety  Goal: Safety Maintained with Care  Outcome: Met     Problem: Adult Inpatient Plan of Care  Goal: Readiness for Transition of Care  Outcome: Met     
VS stable. Pt AAOx4.         Problem: Syncope  Goal: Absence of Syncopal Symptoms  Outcome: Ongoing, Progressing     Problem: Bariatric Environmental Safety  Goal: Safety Maintained with Care  Outcome: Ongoing, Progressing     Problem: Adult Inpatient Plan of Care  Goal: Readiness for Transition of Care  Outcome: Ongoing, Progressing     
DISPLAY PLAN FREE TEXT

## 2023-04-22 NOTE — DISCHARGE SUMMARY
[place] - [facility] Surg ([facility])  [facility] Medicine  Discharge Summary      Patient Name: Dequan Cardenas  MRN: [id number]  [facility]: [id number]  Patient Class: OP- Observation  Admission Date: 4/21/2023  Hospital Length of Stay: 0 days  Discharge Date and Time:  04/22/2023 9:41 AM  Attending Physician: Gale Blum MD   Discharging Provider: Gale Blum MD  Primary Care Provider: Akhil Dill MD    Primary Care Team: Networked reference to record PCT     HPI:   52 year old female with sjogrens and may thurner syndrome as well as ibd was getting to school this morning and stopped at [employer] for a coke. However, on her way to school and this morning she felt dizzy. Once she got to school she says she felt really bad and told her friend that she might not be able to make it and to come check on her in case she passed out. She got very dizzy when getting out of her car so she was sat back down and EMS was called. When she tried to stand again she had a complete syncopal spell. She says she was told that she looked pale and bad. The first time she felt like she was going to pass out and 'felt it coming on.' The second time it was witness per EMS. Work up at that point was normal. In the ER she was seen by cardiology who felt like the etiology was non cardiac. She was also evaluated and neurology has recommended that she be observed for resolution of dizziness and no further spells. After eating, her symtpoms have pretty much resolved.      * No surgery found *      Hospital Course:   Overnight, when she was lying down, patient had recurrence of her dizziness. This was worse when getting up to go to the bathroom. She says it was true room spinning. No symptoms this morning. Sugar 78 at the time. No arrhythmias noted on tele.       Goals of Care Treatment Preferences:  Code Status: Full Code      Consults:   Consults (From admission, onward)        Status Ordering Provider     Inpatient consult to Cardiology-[facility]  Once    "     Provider:  (Not yet assigned)    Completed CHARLES WILCOX          Cardiac/Vascular  May-Thurner syndrome  Not on anticoagulation.  "found by accident by cis"      Other  * Syncope  ER work up reassuring.  Keep on tele overnight.  MRI reassuring.  May d/c in am for further outpatinet workup if dizziness has resolved and patient without further symptoms.    No further episodes.  Start on meclizine. F/u with neurology early this week for clearance to RTW and eval of cyst.        Final Active Diagnoses:    Diagnosis Date Noted POA    PRINCIPAL PROBLEM:  Syncope [R55] 04/21/2023 Yes    May-Thurner syndrome [I87.1] 04/21/2023 Yes    IBD (inflammatory bowel disease) [K52.9] 04/21/2023 Yes    Sjogren syndrome with keratoconjunctivitis [M35.01] 04/21/2023 Yes      Problems Resolved During this Admission:       Discharged Condition: good    Disposition: Home or Self Care    Follow Up:   Follow-up Information     Victor Manuel Muñoz MD Follow up.    Specialty: Neurology  Why: Needs appt early this week with one of the providers in the neurology clinic please.  Contact information:  [address] [address]  [phone]                       Patient Instructions:   No discharge procedures on file.    Significant Diagnostic Studies: N/A  MRI  1. No evidence for recent ischemia.  2. Rounded cystic signal abnormality within the left lateral ventricle abutting the septum pellucidum is nonspecific.  Recommend follow-up contrast enhanced MRI of the brain to further assess.    Pending Diagnostic Studies:     None         Medications:  Reconciled Home Medications:      Medication List      START taking these medications    meclizine 25 mg tablet  Commonly known as: ANTIVERT  Take 1 tablet (25 mg total) by mouth 3 (three) times daily. for 3 days     ondansetron 4 MG Tbdl  Commonly known as: ZOFRAN-ODT  Take 1 tablet (4 mg total) by mouth every 6 (six) hours as needed (nausea).        CONTINUE taking these " medications    colestipoL 1 gram Tab  Commonly known as: COLESTID  1 g once daily.     diphenoxylate-atropine 2.5-0.025 mg 2.5-0.025 mg per tablet  Commonly known as: LOMOTIL  Take 1 tablet by mouth 2 (two) times daily as needed.     VITAMIN D ORAL  Take by mouth.            Indwelling Lines/Drains at time of discharge:   Lines/Drains/Airways     None                 Time spent on the discharge of patient: 15 minutes         Gale Blum MD  Department of Hospital Medicine  [place] - [facility] Surg (3rd Fl)

## 2023-04-22 NOTE — PROGRESS NOTES
[place] [facility] ([facility])  [facility] Medicine  Progress Note    Patient Name: Dequan Cardenas  MRN: [id number]  Patient Class: OP- Observation   Admission Date: 4/21/2023  Length of Stay: 0 days  Attending Physician: Gale Blum MD  Primary Care Provider: Akhil Dill MD        Subjective:     Principal Problem:<principal problem not specified>        HPI:  52 year old female with sjogrens and may thurner syndrome as well as ibd was getting to school this morning and stopped at [employer] for a coke. However, on her way to school and this morning she felt dizzy. Once she got to school she says she felt really bad and told her friend that she might not be able to make it and to come check on her in case she passed out. She got very dizzy when getting out of her car so she was sat back down and EMS was called. When she tried to stand again she had a complete syncopal spell. She says she was told that she looked pale and bad. The first time she felt like she was going to pass out and 'felt it coming on.' The second time it was witness per EMS. Work up at that point was normal. In the ER she was seen by cardiology who felt like the etiology was non cardiac. She was also evaluated and neurology has recommended that she be observed for resolution of dizziness and no further spells. After eating, her symtpoms have pretty much resolved.      Overview/Hospital Course:  Overnight, when she was lying down, patient had recurrence of her dizziness. This was worse when getting up to go to the bathroom. She says it was true room spinning. No symptoms this morning. Sugar 78 at the time. No arrhythmias noted on tele.      Interval History: dizziness last night while lying down. No syncope    Review of Systems   Constitutional:  Negative for chills and fever.   HENT:  Negative for congestion, ear pain, postnasal drip, rhinorrhea, sore throat and trouble swallowing.    Eyes:  Negative for redness and itching.   Respiratory:   Negative for cough, shortness of breath and wheezing.    Cardiovascular:  Negative for chest pain and palpitations.   Gastrointestinal:  Negative for abdominal pain, diarrhea, nausea and vomiting.   Genitourinary:  Negative for dysuria and frequency.   Skin:  Negative for rash.   Neurological:  Positive for dizziness. Negative for weakness and headaches.   Objective:     Vital Signs (Most Recent):  Temp: 97.8 °F (36.6 °C) (04/22/23 0721)  Pulse: 62 (04/22/23 0800)  Resp: 12 (04/22/23 0721)  BP: 133/66 (04/22/23 0721)  SpO2: 99 % (04/22/23 0800) Vital Signs (24h Range):  Temp:  [97 °F (36.1 °C)-98.2 °F (36.8 °C)] 97.8 °F (36.6 °C)  Pulse:  [] 62  Resp:  [12-20] 12  SpO2:  [97 %-100 %] 99 %  BP: (112-146)/(56-79) 133/66     Weight: 104.8 kg (231 lb)  Body mass index is 42.25 kg/m².    Intake/Output Summary (Last 24 hours) at 4/22/2023 0935  Last data filed at 4/21/2023 1116  Gross per 24 hour   Intake 1000 ml   Output --   Net 1000 ml      Physical Exam  Vitals and nursing note reviewed.   Constitutional:       General: She is not in acute distress.     Appearance: She is well-developed.   HENT:      Head: Normocephalic and atraumatic.   Eyes:      Conjunctiva/sclera: Conjunctivae normal.      Pupils: Pupils are equal, round, and reactive to light.   Neck:      Thyroid: No thyromegaly.   Cardiovascular:      Rate and Rhythm: Normal rate and regular rhythm.      Heart sounds: Normal heart sounds.   Pulmonary:      Effort: Pulmonary effort is normal. No respiratory distress.      Breath sounds: Normal breath sounds. No wheezing.   Abdominal:      General: Bowel sounds are normal.      Palpations: Abdomen is soft.      Tenderness: There is no abdominal tenderness.   Musculoskeletal:         General: Normal range of motion.      Cervical back: Normal range of motion and neck supple.   Lymphadenopathy:      Cervical: No cervical adenopathy.   Skin:     General: Skin is warm and dry.      Findings: No rash.  "  Neurological:      Mental Status: She is alert and oriented to person, place, and time.   Psychiatric:         Behavior: Behavior normal.       Significant Labs: All pertinent labs within the past 24 hours have been reviewed.    Significant Imaging: I have reviewed all pertinent imaging results/findings within the past 24 hours.    1. No evidence for recent ischemia.  2. Rounded cystic signal abnormality within the left lateral ventricle abutting the septum pellucidum is nonspecific.  Recommend follow-up contrast enhanced MRI of the brain to further assess.      Assessment/Plan:      Sjogren syndrome with keratoconjunctivitis  Not taking meds - "only affects her eyes."      IBD (inflammatory bowel disease)  At baseline. Has had no recent diarrhea      May-Thurner syndrome  Not on anticoagulation.  "found by accident by cis"      Syncope  ER work up reassuring.  Keep on tele overnight.  MRI reassuring.  May d/c in am for further outpatinet workup if dizziness has resolved and patient without further symptoms.    No further episodes.  Start on meclizine. F/u with neurology early this week for clearance to RTW and eval of cyst.        VTE Risk Mitigation (From admission, onward)         Ordered     IP VTE HIGH RISK PATIENT  Once         04/21/23 1627     Place sequential compression device  Until discontinued         04/21/23 1627                Discharge Planning   GREYSON:      Code Status: Full Code   Is the patient medically ready for discharge?:     Reason for patient still in hospital (select all that apply): Pending disposition                     Gale Blum MD  Department of Hospital Medicine   [place] [facility] (3rd Fl)  "

## 2023-04-22 NOTE — ASSESSMENT & PLAN NOTE
ER work up reassuring.  Keep on tele overnight.  MRI reassuring.  May d/c in am for further outpatinet workup if dizziness has resolved and patient without further symptoms.    No further episodes.  Start on meclizine. F/u with neurology early this week for clearance to RTW and eval of cyst.

## 2023-04-25 ENCOUNTER — TELEPHONE (OUTPATIENT)
Dept: NEUROLOGY | Facility: CLINIC | Age: 53
End: 2023-04-25

## 2023-04-25 ENCOUNTER — OFFICE VISIT (OUTPATIENT)
Dept: NEUROLOGY | Facility: CLINIC | Age: 53
End: 2023-04-25
Payer: COMMERCIAL

## 2023-04-25 VITALS
SYSTOLIC BLOOD PRESSURE: 112 MMHG | DIASTOLIC BLOOD PRESSURE: 66 MMHG | HEART RATE: 87 BPM | OXYGEN SATURATION: 98 % | BODY MASS INDEX: 40.85 KG/M2 | HEIGHT: 62 IN | WEIGHT: 222 LBS | RESPIRATION RATE: 20 BRPM

## 2023-04-25 DIAGNOSIS — H93.8X2 EAR FULLNESS, LEFT: ICD-10-CM

## 2023-04-25 DIAGNOSIS — R42 VERTIGO: ICD-10-CM

## 2023-04-25 DIAGNOSIS — G93.0 CEREBELLAR CYST: Primary | ICD-10-CM

## 2023-04-25 PROCEDURE — 3008F BODY MASS INDEX DOCD: CPT | Mod: CPTII,S$GLB,, | Performed by: PSYCHIATRY & NEUROLOGY

## 2023-04-25 PROCEDURE — 3078F PR MOST RECENT DIASTOLIC BLOOD PRESSURE < 80 MM HG: ICD-10-PCS | Mod: CPTII,S$GLB,, | Performed by: PSYCHIATRY & NEUROLOGY

## 2023-04-25 PROCEDURE — 99999 PR PBB SHADOW E&M-EST. PATIENT-LVL V: ICD-10-PCS | Mod: PBBFAC,,, | Performed by: PSYCHIATRY & NEUROLOGY

## 2023-04-25 PROCEDURE — 1159F MED LIST DOCD IN RCRD: CPT | Mod: CPTII,S$GLB,, | Performed by: PSYCHIATRY & NEUROLOGY

## 2023-04-25 PROCEDURE — 3078F DIAST BP <80 MM HG: CPT | Mod: CPTII,S$GLB,, | Performed by: PSYCHIATRY & NEUROLOGY

## 2023-04-25 PROCEDURE — 3074F PR MOST RECENT SYSTOLIC BLOOD PRESSURE < 130 MM HG: ICD-10-PCS | Mod: CPTII,S$GLB,, | Performed by: PSYCHIATRY & NEUROLOGY

## 2023-04-25 PROCEDURE — 1160F PR REVIEW ALL MEDS BY PRESCRIBER/CLIN PHARMACIST DOCUMENTED: ICD-10-PCS | Mod: CPTII,S$GLB,, | Performed by: PSYCHIATRY & NEUROLOGY

## 2023-04-25 PROCEDURE — 1160F RVW MEDS BY RX/DR IN RCRD: CPT | Mod: CPTII,S$GLB,, | Performed by: PSYCHIATRY & NEUROLOGY

## 2023-04-25 PROCEDURE — 99205 PR OFFICE/OUTPT VISIT, NEW, LEVL V, 60-74 MIN: ICD-10-PCS | Mod: S$GLB,,, | Performed by: PSYCHIATRY & NEUROLOGY

## 2023-04-25 PROCEDURE — 99999 PR PBB SHADOW E&M-EST. PATIENT-LVL V: CPT | Mod: PBBFAC,,, | Performed by: PSYCHIATRY & NEUROLOGY

## 2023-04-25 PROCEDURE — 3008F PR BODY MASS INDEX (BMI) DOCUMENTED: ICD-10-PCS | Mod: CPTII,S$GLB,, | Performed by: PSYCHIATRY & NEUROLOGY

## 2023-04-25 PROCEDURE — 3074F SYST BP LT 130 MM HG: CPT | Mod: CPTII,S$GLB,, | Performed by: PSYCHIATRY & NEUROLOGY

## 2023-04-25 PROCEDURE — 99205 OFFICE O/P NEW HI 60 MIN: CPT | Mod: S$GLB,,, | Performed by: PSYCHIATRY & NEUROLOGY

## 2023-04-25 PROCEDURE — 1159F PR MEDICATION LIST DOCUMENTED IN MEDICAL RECORD: ICD-10-PCS | Mod: CPTII,S$GLB,, | Performed by: PSYCHIATRY & NEUROLOGY

## 2023-04-25 RX ORDER — DIAZEPAM 5 MG/1
TABLET ORAL
Qty: 2 TABLET | Refills: 0 | Status: SHIPPED | OUTPATIENT
Start: 2023-04-25 | End: 2023-08-29 | Stop reason: ALTCHOICE

## 2023-04-25 NOTE — PROGRESS NOTES
"    HPI: Dequan Cardenas is a 52 y.o. female who presents for evaluation of Syncope:    Friday am she was driving to school and was feeling find but suddenly felt light headed while driving    No movement or spinning. When she tried to get out of the car at work she "passed out" for a few seconds but she clarified she only collapses and then again when she was being placed on the stretcher. She remembers the entire spell and feels like SHE DID not have an LOC    She vomited.     She does not remember having a headache/ does not have a headache history    Later that day, she  She had no dizziness/vertigo and nursing noted "dizzy/motion sickness and nauseous episode. Patient tried going to bathroom and was unable to tolerate standing. "    She continues with dizziness.    She can lay on her right sided and no her left.    She has a weird sensation with her left ear    Was on antivert/ didn't really help.    Symptoms are worse with head turning and change of positions    Has been off of work due to symptoms    MRI brain done as noted below/ no offer for PT to date      She fainted more than once in pregnancy years ago.       Review of Systems   Constitutional:  Negative for fever.   HENT:  Negative for hearing loss and tinnitus.    Eyes:  Negative for double vision.   Respiratory:  Negative for hemoptysis.    Cardiovascular:  Negative for leg swelling.   Gastrointestinal:  Negative for blood in stool.   Genitourinary:  Negative for hematuria.   Musculoskeletal:  Positive for falls.        Fell due to dizziness   Skin:  Negative for rash.   Neurological:  Negative for seizures.   Endo/Heme/Allergies:  Does not bruise/bleed easily.       I have reviewed all of this patient's past medical and surgical histories as well as family and social histories and active allergies and medications as documented in the electronic medical record.        Exam:  Gen Appearance, well developed/nourished in no apparent distress  CV: 2+ " "distal pulses with no edema or swelling  Neuro:  MS: Awake, alert, oriented to place, person, time, situation. Sustains attention. Recent/remote memory intact, Language is full to spontaneous speech/repetition/naming/comprehension. Fund of Knowledge is full  CN: Optic discs are flat with normal vasculature, PERRL, Extraoccular movements and visual fields are full. Normal facial sensation and strength, Hearing symmetric, Tongue and Palate are midline and strong. Shoulder Shrug symmetric and strong.  Motor: Normal bulk, tone, no abnormal movements. 5/5 strength bilateral upper/lower extremities with 2+ reflexes and no clonus  Sensory: symmetric to temp, and vibration, Romberg  mildly positive  Cerebellar: Finger-nose,Heal-shin, Rapid alternating movements intact  Gait: Normal stance, no ataxia  TM normal bilaterally  Head trust maneuver does not change her continues vertigo and does not cause nystagmus , but she does keep her head still to avoid vertigo    Imagin2023 CT head: No acute intracranial process detected.    2023 MRI brain: . No evidence for recent ischemia.  2. Rounded cystic signal abnormality within the left lateral ventricle abutting the septum pellucidum is nonspecific.  Recommend follow-up contrast enhanced MRI of the brain to further assess.     2023 MRA brain: No evidence for intracranial aneurysm, stenosis, or slow flow within the anterior or posterior circulation.    Labs:      Assessment/Plan: Dequan Cardenas is a 52 y.o. female with vertigo for 4 days  I recommend:      MRI brain showed no acute changes  2.   MRA brain was normal  3. Incidentally found by MRI brain:   "Rounded cystic signal abnormality within the left lateral ventricle abutting the septum pellucidum is nonspecific"  -Will check MRI brain with contrast, but I feel this area is NOT contributing to her symptoms  -Valium given per orders for Claustrophobia. Patient was instructed not to drive to or from study.   4. Rather " than syncope, her symptoms are consistent with vertigo. She does not think she ever had a LOC and description per nursing was a dizziness/vertigo spell  -Cardiology noted mild nystagmus / transient on ER exam which likely suggests peripheral vertigo  -She also has abnormal sensation in the left ear  -refer to PT for vestibular rehab and ENT for left ear symptoms with vertigo   -Remain off of work and driving until dizziness improves    RTC given

## 2023-04-25 NOTE — TELEPHONE ENCOUNTER
[facility] [phone], confirmation received.   ENT referral faxed to [facility]NT [phone], confirmation received.

## 2023-05-12 ENCOUNTER — HOSPITAL ENCOUNTER (OUTPATIENT)
Dept: RADIOLOGY | Facility: HOSPITAL | Age: 53
Discharge: HOME | End: 2023-05-12
Attending: PSYCHIATRY & NEUROLOGY
Payer: COMMERCIAL

## 2023-05-12 DIAGNOSIS — R42 VERTIGO: ICD-10-CM

## 2023-05-12 DIAGNOSIS — G93.0 CEREBELLAR CYST: ICD-10-CM

## 2023-05-12 PROCEDURE — A9585 GADOBUTROL INJECTION: HCPCS | Performed by: PSYCHIATRY & NEUROLOGY

## 2023-05-12 PROCEDURE — 70552 MRI BRAIN STEM W/DYE: CPT | Mod: TC

## 2023-05-12 PROCEDURE — 25500020 PHARM REV CODE 255: Performed by: PSYCHIATRY & NEUROLOGY

## 2023-05-12 RX ORDER — GADOBUTROL 604.72 MG/ML
10 INJECTION INTRAVENOUS
Status: COMPLETED | OUTPATIENT
Start: 2023-05-12 | End: 2023-05-12

## 2023-05-12 RX ADMIN — GADOBUTROL 10 ML: 604.72 INJECTION INTRAVENOUS at 07:05

## 2023-06-27 ENCOUNTER — TELEPHONE (OUTPATIENT)
Dept: NEUROLOGY | Facility: HOSPITAL | Age: 53
End: 2023-06-27
Payer: COMMERCIAL

## 2023-06-27 NOTE — TELEPHONE ENCOUNTER
Called by patient's chiropractor, Angelo Reynaga. He witnessed her faint twice on two standing attempts. He said she was pale and sweaty. She did not have seizure activity and did not appear to have any obvious vertigo.  Suggested the patient reconsult with cardiology/ he is in agreement and will notify patient.

## 2023-07-10 ENCOUNTER — TELEPHONE (OUTPATIENT)
Dept: NEUROLOGY | Facility: CLINIC | Age: 53
End: 2023-07-10
Payer: COMMERCIAL

## 2023-07-10 NOTE — TELEPHONE ENCOUNTER
----- Message from Victor Manuel Muñoz MD sent at 2023  4:56 PM CDT -----  Contact: PATIENT  Yes approved  Thanks    ----- Message -----  From: Catrina Yoo LPN  Sent: 2023   4:37 PM CDT  To: Victor Manuel Muñoz MD    Please advise if acceptable   ----- Message -----  From: Irlanda Wallace  Sent: 2023   4:34 PM CDT  To: Toni CAMERON Staff    Dequan Cardenas  MRN: [id number]  : 1970  PCP: Akhil Dill  Home Phone      [phone]  Work Phone      Not on file.  Mobile          [phone]      MESSAGE: Patient would like to know if Dr. Muñoz can write her an excuse for jury duty since she is still doing testing to see what is going on with her.          Phone: [phone]

## 2023-07-10 NOTE — LETTER
[facility] - Neurology  [address] [address]  Phone: [phone]  Fax: [phone] July 10, 2023    Dequan Cardenas  [address] [address]      To Whom It May Concern:    Dequan Cardenas is unable to participate in jury duty due to an on- going medical condition that is still under investigation by multiple providers.    If you have any questions or concerns, please feel free to call my office.    Sincerely,        Victor Manuel Muñoz MD

## 2023-07-25 ENCOUNTER — PATIENT MESSAGE (OUTPATIENT)
Dept: NEUROLOGY | Facility: CLINIC | Age: 53
End: 2023-07-25
Payer: COMMERCIAL

## 2023-08-29 ENCOUNTER — OFFICE VISIT (OUTPATIENT)
Dept: NEUROLOGY | Facility: CLINIC | Age: 53
End: 2023-08-29
Payer: COMMERCIAL

## 2023-08-29 VITALS
RESPIRATION RATE: 16 BRPM | BODY MASS INDEX: 40.86 KG/M2 | WEIGHT: 230.63 LBS | OXYGEN SATURATION: 100 % | DIASTOLIC BLOOD PRESSURE: 72 MMHG | SYSTOLIC BLOOD PRESSURE: 116 MMHG | HEART RATE: 75 BPM | HEIGHT: 63 IN

## 2023-08-29 DIAGNOSIS — G93.0 CHOROID PLEXUS CYST: Primary | ICD-10-CM

## 2023-08-29 DIAGNOSIS — R55 SYNCOPE, UNSPECIFIED SYNCOPE TYPE: ICD-10-CM

## 2023-08-29 DIAGNOSIS — R42 VERTIGO: ICD-10-CM

## 2023-08-29 PROCEDURE — 1159F MED LIST DOCD IN RCRD: CPT | Mod: CPTII,S$GLB,, | Performed by: PSYCHIATRY & NEUROLOGY

## 2023-08-29 PROCEDURE — 3008F BODY MASS INDEX DOCD: CPT | Mod: CPTII,S$GLB,, | Performed by: PSYCHIATRY & NEUROLOGY

## 2023-08-29 PROCEDURE — 3078F DIAST BP <80 MM HG: CPT | Mod: CPTII,S$GLB,, | Performed by: PSYCHIATRY & NEUROLOGY

## 2023-08-29 PROCEDURE — 99214 OFFICE O/P EST MOD 30 MIN: CPT | Mod: S$GLB,,, | Performed by: PSYCHIATRY & NEUROLOGY

## 2023-08-29 PROCEDURE — 3074F SYST BP LT 130 MM HG: CPT | Mod: CPTII,S$GLB,, | Performed by: PSYCHIATRY & NEUROLOGY

## 2023-08-29 PROCEDURE — 99999 PR PBB SHADOW E&M-EST. PATIENT-LVL III: CPT | Mod: PBBFAC,,, | Performed by: PSYCHIATRY & NEUROLOGY

## 2023-08-29 PROCEDURE — 1160F RVW MEDS BY RX/DR IN RCRD: CPT | Mod: CPTII,S$GLB,, | Performed by: PSYCHIATRY & NEUROLOGY

## 2023-08-29 PROCEDURE — 1159F PR MEDICATION LIST DOCUMENTED IN MEDICAL RECORD: ICD-10-PCS | Mod: CPTII,S$GLB,, | Performed by: PSYCHIATRY & NEUROLOGY

## 2023-08-29 PROCEDURE — 99999 PR PBB SHADOW E&M-EST. PATIENT-LVL III: ICD-10-PCS | Mod: PBBFAC,,, | Performed by: PSYCHIATRY & NEUROLOGY

## 2023-08-29 PROCEDURE — 3078F PR MOST RECENT DIASTOLIC BLOOD PRESSURE < 80 MM HG: ICD-10-PCS | Mod: CPTII,S$GLB,, | Performed by: PSYCHIATRY & NEUROLOGY

## 2023-08-29 PROCEDURE — 99214 PR OFFICE/OUTPT VISIT, EST, LEVL IV, 30-39 MIN: ICD-10-PCS | Mod: S$GLB,,, | Performed by: PSYCHIATRY & NEUROLOGY

## 2023-08-29 PROCEDURE — 3074F PR MOST RECENT SYSTOLIC BLOOD PRESSURE < 130 MM HG: ICD-10-PCS | Mod: CPTII,S$GLB,, | Performed by: PSYCHIATRY & NEUROLOGY

## 2023-08-29 PROCEDURE — 3008F PR BODY MASS INDEX (BMI) DOCUMENTED: ICD-10-PCS | Mod: CPTII,S$GLB,, | Performed by: PSYCHIATRY & NEUROLOGY

## 2023-08-29 PROCEDURE — 1160F PR REVIEW ALL MEDS BY PRESCRIBER/CLIN PHARMACIST DOCUMENTED: ICD-10-PCS | Mod: CPTII,S$GLB,, | Performed by: PSYCHIATRY & NEUROLOGY

## 2023-08-29 NOTE — PROGRESS NOTES
HPI: Dequan Cardenas is a 53 y.o. female with syncopal type spells    This is her follow up visit    In 4/2023, I was called by patient's chiropractor, Angelo Reynaga. He witnessed her faint twice on two standing attempts. He said she was pale and sweaty. She did not have seizure activity and did not appear to have any obvious vertigo.  Suggested the patient reconsult with cardiology at that time      She had been on a fluid pill for ear per ENT which she felt contributed    Her Pulse was low and this medication was removed and this improved          In June, she a bout of vertigo again with moving out of bed and this lasted for a few weeks.   ENT placed her betahistine for vertigo which helped.      She continues to follow with cardiology     No seizures    No headaches    Abnormal sensation in the left ear improved    ENT diagnosed her with  Meniere's per her (Dr Palomo)    She is back at Work       She fainted more than once in pregnancy years ago.       Review of Systems   Constitutional:  Negative for fever.   HENT:  Negative for nosebleeds.    Eyes:  Negative for double vision.   Respiratory:  Negative for hemoptysis.    Cardiovascular:  Negative for leg swelling.   Gastrointestinal:  Negative for blood in stool.   Genitourinary:  Negative for hematuria.   Musculoskeletal:  Negative for falls.   Skin:  Negative for rash.   Neurological:  Negative for seizures.   Endo/Heme/Allergies:  Does not bruise/bleed easily.         I have reviewed all of this patient's past medical and surgical histories as well as family and social histories and active allergies and medications as documented in the electronic medical record.        Exam:  Gen Appearance, well developed/nourished in no apparent distress  CV: 2+ distal pulses with no edema or swelling  Neuro:  MS: Awake, alert, oriented to place, person, time, situation. Sustains attention. Recent/remote memory intact, Language is full to spontaneous  "speech/repetition/naming/comprehension. Fund of Knowledge is full  CN: Optic discs are flat with normal vasculature, PERRL, Extraoccular movements and visual fields are full. Normal facial sensation and strength, Hearing symmetric, Tongue and Palate are midline and strong. Shoulder Shrug symmetric and strong.  Motor: Normal bulk, tone, no abnormal movements. 5/5 strength bilateral upper/lower extremities with 2+ reflexes and no clonus  Sensory: symmetric to temp, and vibration, Romberg  mildly positive  Cerebellar: Finger-nose,Heal-shin, Rapid alternating movements intact  Gait: Normal stance, no ataxia  TM normal bilaterally  Head trust maneuver does not change her continues vertigo and does not cause nystagmus , but she does keep her head still to avoid vertigo    Imagin2023 CT head: No acute intracranial process detected.    2023 MRI brain: . No evidence for recent ischemia.  2. Rounded cystic signal abnormality within the left lateral ventricle abutting the septum pellucidum is nonspecific.  Recommend follow-up contrast enhanced MRI of the brain to further assess.     2023 MRA brain: No evidence for intracranial aneurysm, stenosis, or slow flow within the anterior or posterior circulation.    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       Labs:      Assessment/Plan: Dequan Cardenas is a 53 y.o. female with vertigo in   I recommend:      MRI brain showed no acute changes    2.   MRA brain was normal    3. 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  -This is an incidental finding, " "not contributing to her symptoms. She has a family history of brain tumors. Will plan to re-scan in year    4. Rather than syncope, I thought her symptoms were consistent with vertigo. Cardiology noted mild nystagmus / transient on ER exam which likely suggested peripheral vertigo. She has been since treated for Meniere's disease via ENT and is improved. She also tried PT    5. Separately,  her chiropractor noted 2 syncopal spells on standing in his office  -This was felt to be related to "fluid pill" per ENT. Symptoms resolved with stopping this  -She continues to follow with cardiology       RTC 1 year but sooner if new or worse symptoms            "

## 2024-12-16 ENCOUNTER — OFFICE VISIT (OUTPATIENT)
Dept: NEUROLOGY | Facility: CLINIC | Age: 54
End: 2024-12-16
Payer: COMMERCIAL

## 2024-12-16 VITALS
DIASTOLIC BLOOD PRESSURE: 88 MMHG | BODY MASS INDEX: 43.13 KG/M2 | RESPIRATION RATE: 16 BRPM | SYSTOLIC BLOOD PRESSURE: 130 MMHG | HEIGHT: 62 IN | WEIGHT: 234.38 LBS | HEART RATE: 76 BPM

## 2024-12-16 DIAGNOSIS — R55 SYNCOPE, UNSPECIFIED SYNCOPE TYPE: ICD-10-CM

## 2024-12-16 DIAGNOSIS — G93.0 CHOROID PLEXUS CYST: Primary | ICD-10-CM

## 2024-12-16 DIAGNOSIS — R42 VERTIGO: ICD-10-CM

## 2024-12-16 DIAGNOSIS — E66.01 MORBID OBESITY: ICD-10-CM

## 2024-12-16 PROCEDURE — 1159F MED LIST DOCD IN RCRD: CPT | Mod: CPTII,S$GLB,, | Performed by: PSYCHIATRY & NEUROLOGY

## 2024-12-16 PROCEDURE — 99214 OFFICE O/P EST MOD 30 MIN: CPT | Mod: S$GLB,,, | Performed by: PSYCHIATRY & NEUROLOGY

## 2024-12-16 PROCEDURE — 3079F DIAST BP 80-89 MM HG: CPT | Mod: CPTII,S$GLB,, | Performed by: PSYCHIATRY & NEUROLOGY

## 2024-12-16 PROCEDURE — 99999 PR PBB SHADOW E&M-EST. PATIENT-LVL III: CPT | Mod: PBBFAC,,, | Performed by: PSYCHIATRY & NEUROLOGY

## 2024-12-16 PROCEDURE — 3008F BODY MASS INDEX DOCD: CPT | Mod: CPTII,S$GLB,, | Performed by: PSYCHIATRY & NEUROLOGY

## 2024-12-16 PROCEDURE — 1160F RVW MEDS BY RX/DR IN RCRD: CPT | Mod: CPTII,S$GLB,, | Performed by: PSYCHIATRY & NEUROLOGY

## 2024-12-16 PROCEDURE — 3075F SYST BP GE 130 - 139MM HG: CPT | Mod: CPTII,S$GLB,, | Performed by: PSYCHIATRY & NEUROLOGY

## 2024-12-16 NOTE — PROGRESS NOTES
HPI: Dequan Cardenas is a 54 y.o. female with vertigo          Vertigo is improved    Still seeing ENT for  Meniere's and she only had one attack in the last year    Abnormal sensation in the left ear is currently improved    Betahistine is still being used per ENT      She continues to follow with cardiology     No further Syncope    No seizures    No headaches      She fainted more than once in pregnancy years ago.     Weight is up    Drinks 2 soft drinks for breakfast.       Review of Systems   Constitutional:  Negative for fever.   HENT:  Negative for nosebleeds.    Eyes:  Negative for double vision.   Respiratory:  Negative for hemoptysis.    Cardiovascular:  Negative for leg swelling.   Gastrointestinal:  Negative for blood in stool.   Genitourinary:  Negative for hematuria.   Musculoskeletal:  Negative for falls.   Skin:  Negative for rash.   Neurological:  Negative for seizures.   Endo/Heme/Allergies:  Does not bruise/bleed easily.         I have reviewed all of this patient's past medical and surgical histories as well as family and social histories and active allergies and medications as documented in the electronic medical record.        Exam:  Gen Appearance, well developed/nourished in no apparent distress  CV: 2+ distal pulses with no edema or swelling  Neuro:  MS: Awake, alert,  Sustains attention. Recent/remote memory intact, Language is full to spontaneous speech/repetition/naming/comprehension. Fund of Knowledge is full  CN: Optic discs are flat with normal vasculature, PERRL, Extraoccular movements and visual fields are full. Normal facial sensation and strength, Hearing symmetric, Tongue and Palate are midline and strong. Shoulder Shrug symmetric and strong.  Motor: Normal bulk, tone, no abnormal movements. 5/5 strength bilateral upper/lower extremities with 2+ reflexes and no clonus  Sensory: symmetric to temp, and vibration, Romberg  mildly positive  Cerebellar: Finger-nose,Heal-shin,  "Rapid alternating movements intact  Gait: Normal stance, no ataxia  TM normal bilaterally  Head trust maneuver does not change her continues vertigo and does not cause nystagmus , but she does keep her head still to avoid vertigo    Imagin2023 CT head: No acute intracranial process detected.    2023 MRI brain: . No evidence for recent ischemia.  2. Rounded cystic signal abnormality within the left lateral ventricle abutting the septum pellucidum is nonspecific.  Recommend follow-up contrast enhanced MRI of the brain to further assess.     2023 MRA brain: No evidence for intracranial aneurysm, stenosis, or slow flow within the anterior or posterior circulation.    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       Labs:      Assessment/Plan: Dequan Cardenas is a 54 y.o. female with vertigo in   I recommend:       MRI brain showed no acute changes    2.    MRA brain was normal    3. 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  -This is an incidental finding, not contributing to her symptoms. She has a family history of brain tumors. Will plan to re-scan now to ensure stability; otherwise, this can likely be followed PRN    4. Rather than syncope, I thought her symptoms were consistent with vertigo. Cardiology noted mild nystagmus / transient on ER exam which likely suggested peripheral vertigo. She has been since treated for Meniere's disease via ENT and is improved. She also tried PT    5. Separately,  her chiropractor noted 2 syncopal spells on standing in his office  -This was " "felt to be related to "fluid pill" per ENT. Symptoms resolved with stopping this  -She continues to follow with cardiology     6. Discussed walking to improve her morbid obesity and discussed improved (eliminate soft drinks)      RTC 1 year. Patient was asked to call for results.               "

## 2025-01-06 ENCOUNTER — HOSPITAL ENCOUNTER (OUTPATIENT)
Dept: RADIOLOGY | Facility: HOSPITAL | Age: 55
Discharge: HOME | End: 2025-01-06
Attending: PSYCHIATRY & NEUROLOGY
Payer: COMMERCIAL

## 2025-01-06 DIAGNOSIS — G93.0 CHOROID PLEXUS CYST: ICD-10-CM

## 2025-01-06 PROCEDURE — 25500020 PHARM REV CODE 255: Performed by: PSYCHIATRY & NEUROLOGY

## 2025-01-06 PROCEDURE — 70553 MRI BRAIN STEM W/O & W/DYE: CPT | Mod: TC

## 2025-01-06 PROCEDURE — A9585 GADOBUTROL INJECTION: HCPCS | Performed by: PSYCHIATRY & NEUROLOGY

## 2025-01-06 PROCEDURE — 70553 MRI BRAIN STEM W/O & W/DYE: CPT | Mod: 26,,, | Performed by: RADIOLOGY

## 2025-01-06 RX ORDER — GADOBUTROL 604.72 MG/ML
10 INJECTION INTRAVENOUS
Status: COMPLETED | OUTPATIENT
Start: 2025-01-06 | End: 2025-01-06

## 2025-01-06 RX ADMIN — GADOBUTROL 10 ML: 604.72 INJECTION INTRAVENOUS at 06:01
